# Patient Record
Sex: MALE | Race: WHITE | NOT HISPANIC OR LATINO
[De-identification: names, ages, dates, MRNs, and addresses within clinical notes are randomized per-mention and may not be internally consistent; named-entity substitution may affect disease eponyms.]

---

## 2023-08-08 PROBLEM — Z00.00 ENCOUNTER FOR PREVENTIVE HEALTH EXAMINATION: Status: ACTIVE | Noted: 2023-08-08

## 2023-11-22 ENCOUNTER — APPOINTMENT (OUTPATIENT)
Dept: FAMILY MEDICINE | Facility: CLINIC | Age: 53
End: 2023-11-22

## 2024-08-01 ENCOUNTER — APPOINTMENT (OUTPATIENT)
Dept: ORTHOPEDIC SURGERY | Facility: CLINIC | Age: 54
End: 2024-08-01
Payer: COMMERCIAL

## 2024-08-01 VITALS — WEIGHT: 200 LBS | BODY MASS INDEX: 31.39 KG/M2 | HEIGHT: 67 IN

## 2024-08-01 DIAGNOSIS — Z78.9 OTHER SPECIFIED HEALTH STATUS: ICD-10-CM

## 2024-08-01 DIAGNOSIS — M76.62 ACHILLES TENDINITIS, LEFT LEG: ICD-10-CM

## 2024-08-01 DIAGNOSIS — M84.374A STRESS FRACTURE, RIGHT FOOT, INITIAL ENCOUNTER FOR FRACTURE: ICD-10-CM

## 2024-08-01 DIAGNOSIS — Z86.79 PERSONAL HISTORY OF OTHER DISEASES OF THE CIRCULATORY SYSTEM: ICD-10-CM

## 2024-08-01 DIAGNOSIS — Z87.891 PERSONAL HISTORY OF NICOTINE DEPENDENCE: ICD-10-CM

## 2024-08-01 PROCEDURE — 99203 OFFICE O/P NEW LOW 30 MIN: CPT

## 2024-08-01 PROCEDURE — 73610 X-RAY EXAM OF ANKLE: CPT | Mod: 50

## 2024-08-01 RX ORDER — AMLODIPINE BESYLATE 10 MG/1
10 TABLET ORAL
Refills: 0 | Status: ACTIVE | COMMUNITY

## 2024-08-01 RX ORDER — LOSARTAN POTASSIUM 50 MG/1
50 TABLET, FILM COATED ORAL
Refills: 0 | Status: ACTIVE | COMMUNITY

## 2024-08-01 NOTE — HISTORY OF PRESENT ILLNESS
[Sudden] : sudden [Burning] : burning [Dull/Aching] : dull/aching [Sharp] : sharp [Stabbing] : stabbing [Throbbing] : throbbing [Intermittent] : intermittent [Household chores] : household chores [Leisure] : leisure [Social interactions] : social interactions [Rest] : rest [Full time] : Work status: full time [de-identified] : Patient here for bilat ankles. Patient states NKI. Patient states left ankle started having pain 7/15/2024 and right ankle started to have pain within the last week. Patient states left ankle is worse than right. Patient states left ankle has sharp, dull, stabbing and throbbing pain at the base of the Achilles that is constant. Patient states right ankle has aching pain in the Peroneal tendon that is constant with weight bearing.  [] : Post Surgical Visit: no [FreeTextEntry1] : Bilat ankles [FreeTextEntry5] : NKI  [de-identified] : Movement  [de-identified] :

## 2024-08-01 NOTE — PHYSICAL EXAM
[de-identified] : Examination of the left foot and ankle is as follows: INSPECTION: mild swelling over achilles tendon, but no abrasion, no laceration, no erythema, no ecchymosis, no gross deformity, no ecchymosis of foot or ankle PALPATION: achilles tendon tenderness, achilles tendon insertion tenderness ROM: dorsiflexion 20 degrees, plantar flexion 40 degrees, inversion 30 degrees, eversion 20 degrees STRENGTH: dorsiflexion 5/5, plantarflexion 5/5, inversion 5/5, eversion 5/5, EHL 5/5, FHL 5/5 TESTING: negative Naranjo test VASCULAR: dorsalis pedis pulse: 2+, posterior tibialis pulse: 2+ NEURO: Sensation present to light touch in all distributions GAIT: mildly antalgic, but patient ambulates without assistive device  X-rays of the left ankle is as follows:  Ankle 3 View: There are no fractures, subluxations or dislocations. No significant abnormalities are seen, posterior calceneal spur  Examination of the right foot and ankle is as follows: Inspection: no swelling of dorsolateral foot, but no abrasion, laceration, no erythema Palpation: 5th metatarsal shaft ROM: plantarflexion 20 degrees, inversion 15 degrees, eversion 10 degrees, dorsiflexion 10 degrees Strength: dorsiflexion 4/5, plantar flexion 4/5, inversion 4/5, eversion 4/5, EHL 5/5, FHL 5/5 Vascular: dorsalis pedis pulse: 2+, posterior tibialis pulse: 2+ Neuro: Sensation present to light touch in all distributions Gait: normal gait  X-rays of the right foot is as follows:  Foot 3 view: no acute osseous abnormalities

## 2024-08-01 NOTE — ASSESSMENT
[FreeTextEntry1] : 54yo male wit stress reaction of right 5th metatarsal and left insertional achilles tendonitis.  Recommend nonsurgical management.  -rx for PT -Activities as tolerated -Rest, ice, compression, elevation, NSAIDs PRN for pain.  -All questions answered -F/u 6 weeks  The diagnosis was explained in detail. The potential non-surgical and surgical treatments were reviewed. The relative risks and benefits of each option were considered relative to the patients age, activity level, medical history, symptom severity and previously attempted treatments.  The patient was advised to consult with their primary medical provider prior to initiation of any new medications to reduce the risk of adverse effects specific to their long-term home medications and medical history. The risk of gastrointestinal irritation and kidney injury specific to long-term NSAID use was discussed.

## 2024-09-10 ENCOUNTER — APPOINTMENT (OUTPATIENT)
Dept: ORTHOPEDIC SURGERY | Facility: CLINIC | Age: 54
End: 2024-09-10
Payer: COMMERCIAL

## 2024-09-10 DIAGNOSIS — M76.62 ACHILLES TENDINITIS, LEFT LEG: ICD-10-CM

## 2024-09-10 DIAGNOSIS — M84.374D STRESS FRACTURE, RIGHT FOOT, SUBSEQUENT ENCOUNTER FOR FRACTURE WITH ROUTINE HEALING: ICD-10-CM

## 2024-09-10 PROCEDURE — 99213 OFFICE O/P EST LOW 20 MIN: CPT

## 2024-09-10 NOTE — HISTORY OF PRESENT ILLNESS
[Sudden] : sudden [3] : 3 [0] : 0 [Burning] : burning [Dull/Aching] : dull/aching [Sharp] : sharp [Stabbing] : stabbing [Throbbing] : throbbing [Intermittent] : intermittent [Household chores] : household chores [Leisure] : leisure [Social interactions] : social interactions [Rest] : rest [Full time] : Work status: full time [de-identified] : Patient here for bilat ankles. Patient being treated for stress reaction of right 5th metatarsal and left insertional Achilles tendonitis. Patient states he has been going to PT 2-3x a week with good relief. Patient states he has occasional sharp, dull, aching and throbbing pain in his Achilles after impact related activities and being on his feet for long period.  [] : Post Surgical Visit: no [FreeTextEntry1] : Bilat ankles [FreeTextEntry5] : NKI  [de-identified] : Movement  [de-identified] :

## 2024-09-10 NOTE — ASSESSMENT
[FreeTextEntry1] : 52yo male with resolving stress reaction of right 5th metatarsal and left insertional achilles tendinitis. Patient reports that his left insertional Achilles tendinitis is improving with PT and that right foot stress reaction is resolving. Recommend continued non-surgical management. -rx for PT renewed today, strengthening exercises -Activities as tolerated -Rest, ice, compression, elevation, NSAIDs PRN for pain.  -All questions answered -F/u PRN  The diagnosis was explained in detail. The potential non-surgical and surgical treatments were reviewed. The relative risks and benefits of each option were considered relative to the patients age, activity level, medical history, symptom severity and previously attempted treatments.  The patient was advised to consult with their primary medical provider prior to initiation of any new medications to reduce the risk of adverse effects specific to their long-term home medications and medical history. The risk of gastrointestinal irritation and kidney injury specific to long-term NSAID use was discussed.   Entered by Vlad Savage PA-C acting as scribe. Dr. Fitzgerald Attestation The documentation recorded by the scribe, in my presence, accurately reflects the service I, Dr. Fitzgerald, personally performed, and the decisions made by me with my edits as appropriate.

## 2024-09-10 NOTE — PHYSICAL EXAM
[de-identified] : Examination of the bilateral foot and ankle is as follows: INSPECTION: mild swelling over achilles tendon, but no abrasion, no laceration, no erythema, no ecchymosis, no gross deformity, no ecchymosis of foot or ankle PALPATION: left achilles tendon insertion mildly ttp, no ttp over right 5th metatarsal ROM: dorsiflexion 20 degrees, plantar flexion 40 degrees, inversion 30 degrees, eversion 20 degrees STRENGTH: dorsiflexion 5/5, plantarflexion 5/5, inversion 5/5, eversion 5/5, EHL 5/5, FHL 5/5 TESTING: negative Naranjo test VASCULAR: dorsalis pedis pulse: 2+, posterior tibialis pulse: 2+ NEURO: Sensation present to light touch in all distributions GAIT: mildly antalgic, but patient ambulates without assistive device  X-rays of the left ankle is as follows:  Ankle 3 View: There are no fractures, subluxations or dislocations. No significant abnormalities are seen, posterior calceneal spur  Examination of the right foot and ankle is as follows: Inspection: no swelling of dorsolateral foot, but no abrasion, laceration, no erythema Palpation: 5th metatarsal shaft ROM: plantarflexion 20 degrees, inversion 15 degrees, eversion 10 degrees, dorsiflexion 10 degrees Strength: dorsiflexion 4/5, plantar flexion 4/5, inversion 4/5, eversion 4/5, EHL 5/5, FHL 5/5 Vascular: dorsalis pedis pulse: 2+, posterior tibialis pulse: 2+ Neuro: Sensation present to light touch in all distributions Gait: normal gait

## 2025-08-18 ENCOUNTER — APPOINTMENT (OUTPATIENT)
Dept: ORTHOPEDIC SURGERY | Facility: CLINIC | Age: 55
End: 2025-08-18

## 2025-08-18 VITALS — BODY MASS INDEX: 31.39 KG/M2 | HEIGHT: 67 IN | WEIGHT: 200 LBS

## 2025-08-18 DIAGNOSIS — M25.531 PAIN IN RIGHT WRIST: ICD-10-CM

## 2025-08-18 DIAGNOSIS — M25.511 PAIN IN RIGHT SHOULDER: ICD-10-CM

## 2025-08-18 PROCEDURE — 73110 X-RAY EXAM OF WRIST: CPT | Mod: RT

## 2025-08-18 PROCEDURE — 73030 X-RAY EXAM OF SHOULDER: CPT | Mod: RT

## 2025-08-18 PROCEDURE — 99203 OFFICE O/P NEW LOW 30 MIN: CPT
